# Patient Record
Sex: MALE | Race: WHITE
[De-identification: names, ages, dates, MRNs, and addresses within clinical notes are randomized per-mention and may not be internally consistent; named-entity substitution may affect disease eponyms.]

---

## 2021-09-25 ENCOUNTER — HOSPITAL ENCOUNTER (EMERGENCY)
Dept: HOSPITAL 97 - ER | Age: 61
Discharge: HOME | End: 2021-09-25
Payer: COMMERCIAL

## 2021-09-25 VITALS — SYSTOLIC BLOOD PRESSURE: 167 MMHG | OXYGEN SATURATION: 99 % | DIASTOLIC BLOOD PRESSURE: 89 MMHG | TEMPERATURE: 98.2 F

## 2021-09-25 DIAGNOSIS — R33.9: Primary | ICD-10-CM

## 2021-09-25 LAB
ALBUMIN SERPL BCP-MCNC: 4.1 G/DL (ref 3.4–5)
ALP SERPL-CCNC: 90 U/L (ref 45–117)
ALT SERPL W P-5'-P-CCNC: 32 U/L (ref 12–78)
AST SERPL W P-5'-P-CCNC: 43 U/L (ref 15–37)
BLD SMEAR INTERP: (no result)
BUN BLD-MCNC: 13 MG/DL (ref 7–18)
GLUCOSE SERPLBLD-MCNC: 120 MG/DL (ref 74–106)
HCT VFR BLD CALC: 45.7 % (ref 39.6–49)
LIPASE SERPL-CCNC: 73 U/L (ref 73–393)
LYMPHOCYTES # SPEC AUTO: 1.2 K/UL (ref 0.7–4.9)
MORPHOLOGY BLD-IMP: (no result)
PMV BLD: 8.8 FL (ref 7.6–11.3)
POTASSIUM SERPL-SCNC: 4.3 MMOL/L (ref 3.5–5.1)
RBC # BLD: 5.15 M/UL (ref 4.33–5.43)

## 2021-09-25 PROCEDURE — 80076 HEPATIC FUNCTION PANEL: CPT

## 2021-09-25 PROCEDURE — 80048 BASIC METABOLIC PNL TOTAL CA: CPT

## 2021-09-25 PROCEDURE — 74177 CT ABD & PELVIS W/CONTRAST: CPT

## 2021-09-25 PROCEDURE — 99285 EMERGENCY DEPT VISIT HI MDM: CPT

## 2021-09-25 PROCEDURE — 36415 COLL VENOUS BLD VENIPUNCTURE: CPT

## 2021-09-25 PROCEDURE — 83690 ASSAY OF LIPASE: CPT

## 2021-09-25 PROCEDURE — 51702 INSERT TEMP BLADDER CATH: CPT

## 2021-09-25 PROCEDURE — 81015 MICROSCOPIC EXAM OF URINE: CPT

## 2021-09-25 PROCEDURE — 81003 URINALYSIS AUTO W/O SCOPE: CPT

## 2021-09-25 PROCEDURE — 85025 COMPLETE CBC W/AUTO DIFF WBC: CPT

## 2021-09-25 NOTE — RAD REPORT
EXAM DESCRIPTION:  CTAbdomen   Pelvis W Contrast - 9/25/2021 4:06 pm

 

CLINICAL HISTORY:  Abdominal pain.

Difficulty urinating

 

COMPARISON:  No comparisons

 

TECHNIQUE:  Biphasic CT imaging of the abdomen and pelvis was performed with 100 ml non-ionic IV cont
rast.

 

All CT scans are performed using dose optimization technique as appropriate and may include automated
 exposure control or mA/KV adjustment according to patient size.

 

FINDINGS:  The lung bases are clear.

 

Mild fatty liver is seen with a small hepatic cysts present. The spleen, pancreas, adrenal glands and
 kidneys are within normal limits.

 

No bowel obstruction, free air, free fluid or abscess.  The appendix is normal.  No evidence of signi
ficant lymphadenopathy.

 

Assessment of intrapelvic structures is limited by significant streak artifact a Baeza catheter is no
judy in the urinary bladder.

 

Moderate lumbar degenerative changes.

 

IMPRESSION:  No acute intra-abdominal or pelvic finding.

 

There is significant streak artifact reducing image quality of the pelvis.

## 2021-09-25 NOTE — ER
Nurse's Notes                                                                                     

 [facility]                                                                 

Name: Joshua Kruger                                                                               

Age: 61 yrs                                                                                       

Sex: Male                                                                                         

: 1960                                                                                   

MRN: [id number]                                                                                   

Arrival Date: 2021                                                                          

Time: 14:31                                                                                       

Account#: [id number]                                                                            

Bed 18                                                                                            

Private MD:                                                                                       

Diagnosis: Retention of urine, unspecified                                                        

                                                                                                  

Presentation:                                                                                     

                                                                                             

14:36 Chief complaint: Patient states: Unable to urinate well since last night. No fever. No  ll1 

      significant pain,. Coronavirus screen: Vaccine status: Patient reports receiving the        

      1st dose of the Covid vaccine. Client denies travel out of the [place] in the last 14          

      days. At this time, the client does not indicate any symptoms associated with               

      coronavirus-19. Ebola Screen: Patient denies travel to an Ebola-affected area in the 21     

      days before illness onset. Initial Sepsis Screen: Does the patient meet any 2 criteria?     

      HR > 90 bpm. No. Patient's initial sepsis screen is negative. Does the patient have a       

      suspected source of infection? Yes: Dysuria/Frequency/Urgency/UTI. Risk Assessment: Do      

      you want to hurt yourself or someone else? Patient reports no desire to harm self or        

      others. Onset of symptoms was 2021.                                           

14:36 Method Of Arrival: Ambulatory                                                           ll1 

14:36 Acuity: JANICE 3                                                                           ll1 

                                                                                                  

Historical:                                                                                       

- Allergies:                                                                                      

14:36 No Known Allergies;                                                                     ll1 

- PMHx:                                                                                           

14:36 None;                                                                                   ll1 

- PSHx:                                                                                           

14:36 hip replacements;                                                                       ll1 

                                                                                                  

- Immunization history:: Client reports receiving the Jus \T\ Jus single-dose             

  vaccine. Flu vaccine status is unknown.                                                         

- Social history:: Smoking status: Patient denies any tobacco usage or history of.                

                                                                                                  

                                                                                                  

Screening:                                                                                        

15:46 Abuse screen: Denies threats or abuse. Denies injuries from another. Nutritional        tr6 

      screening: No deficits noted. Tuberculosis screening: No symptoms or risk factors           

      identified. Fall Risk None identified.                                                      

                                                                                                  

Assessment:                                                                                       

15:46 General: Appears in no apparent distress. uncomfortable, Behavior is calm, cooperative, tr6 

      appropriate for age. Pain: Complains of pain in lower abdomen. Neuro: No deficits           

      noted. Cardiovascular: No deficits noted. Respiratory: No deficits noted. GI: Bowel         

      sounds present X 4 quads. Abdomen is tender to palpation. : Reports inability to          

      void, since last night. EENT: No deficits noted. Derm: No deficits noted.                   

      Musculoskeletal: No deficits noted.                                                         

17:05 Reassessment: JESSEE Merritt discussed results and POC with pt. pt verbalized understanding tr6 

      and would like the littlejohn removed. Littlejohn removed by this RN and pt tolerated well. pt        

      understands that it is possible to have retention once again. pt verbalized                 

      understanding and understands that he will have to come back to the ED if problem           

      persists..                                                                                  

                                                                                                  

Vital Signs:                                                                                      

14:36  / 89; Pulse 97; Resp 17; Temp 98.2; Pulse Ox 99% ; Weight 80.74 kg; Height 5 ft. ll1 

      11 in. (180.34 cm); Pain 1/10;                                                              

14:36 Body Mass Index 24.83 (80.74 kg, 180.34 cm)                                             ll1 

                                                                                                  

ED Course:                                                                                        

14:31 Patient arrived in ED.                                                                  mr  

14:36 Arm band placed on.                                                                     ll1 

14:37 Triage completed.                                                                       ll1 

14:39 Breonna Crum, RN is Primary Nurse.                                                 tr6 

14:40 Jeremy Merritt NP is PHCP.                                                           pm1 

14:40 Heber Vieyra MD is Attending Physician.                                           pm1 

15:04 Bladder scan completed. 850. Littlejohn cath inserted, using sterile technique, 16 Fr., by   mt  

      me, balloon inflated, to gravity drainage, returned clear yellow urine. Patient             

      tolerated well.                                                                             

15:30 Inserted saline lock: 20 gauge in right antecubital area, using aseptic technique.      tr6 

      Blood collected.                                                                            

15:46 No apparent distress. Resting quietly. Awaiting lab results.                            tr6 

15:46 Patient has correct armband on for positive identification. Placed in gown. Bed in low  tr6 

      position. Call light in reach. Pulse ox on. NIBP on. Door closed. Noise minimized.          

      Visitors limited. Lights dimmed. Moved to private room. Warm blanket given. Diet:           

      Patient is NPO.                                                                             

15:46 No provider procedures requiring assistance completed. Patient maintains SpO2           tr6 

      saturation greater than 95% on room air.                                                    

15:57 Patient moved to CT.                                                                    tr6 

16:05 CT Abd/Pelvis - IV Contrast Only In Process Unspecified.                                EDMS

16:50 IV discontinued, intact, bleeding controlled, No redness/swelling at site. Pressure     tr6 

      dressing applied.                                                                           

17:04 Littlejohn cath removed intact, balloon deflated.                                            tr6 

                                                                                                  

Administered Medications:                                                                         

No medications were administered                                                                  

                                                                                                  

                                                                                                  

Output:                                                                                           

15:04 Urine: 1000ml (Littlejohn); Total: 1000ml.                                                   mt  

17:00 Urine: 400ml (Littlejohn); Total: 1400ml.                                                    tr6 

                                                                                                  

Outcome:                                                                                          

16:39 Discharge ordered by MD.                                                                pm1 

16:49 Discharged to home ambulatory.                                                          tr6 

16:49 Condition: good                                                                             

16:49 Discharge instructions given to patient, Instructed on discharge instructions, follow       

      up and referral plans. safety practices, Demonstrated understanding of instructions,        

      follow-up care, Prescriptions given X                                                       

17:05 Patient left the ED.                                                                    tr6 

                                                                                                  

Signatures:                                                                                       

Dispatcher [employer]                           EDMS                                                 

JaquanJazlyn                                 mr MerrittJeremy, NP                    NP   pm1                                                  

Eun Raza mt, Lynsay RN                       RN   ll1                                                  

Breonna Crum RN                   RN   tr6                                                  

                                                                                                  

**************************************************************************************************

## 2021-09-25 NOTE — EDPHYS
Physician Documentation                                                                           

 [facility]                                                                 

Name: Joshua Kruger                                                                               

Age: 61 yrs                                                                                       

Sex: Male                                                                                         

: 1960                                                                                   

MRN: [id number]                                                                                   

Arrival Date: 2021                                                                          

Time: 14:31                                                                                       

Account#: [id number]                                                                            

Bed 18                                                                                            

Private MD:                                                                                       

ED Physician Heber Vieyra                                                                    

HPI:                                                                                              

                                                                                             

15:20 This 61 yrs old  Male presents to ER via Ambulatory with complaints of Urinary pm1 

      Problem.                                                                                    

15:20 Onset: The symptoms/episode began/occurred yesterday.                                   pm1 

15:20 The patient presents with urinary symptoms, unable to void. Modifying factors: The      pm1 

      symptoms are alleviated by nothing, the symptoms are aggravated by nothing. Associated      

      signs and symptoms: Pertinent negatives: abdominal pain, constipation, fever, nausea,       

      vomiting. Severity of symptoms: in the emergency department the symptoms are unchanged.     

      The patient has not experienced similar symptoms in the past. The patient has not           

      recently seen a physician. Patient recently taking cough congestion medication.             

                                                                                                  

Historical:                                                                                       

- Allergies:                                                                                      

14:36 No Known Allergies;                                                                     ll1 

- PMHx:                                                                                           

14:36 None;                                                                                   ll1 

- PSHx:                                                                                           

14:36 hip replacements;                                                                       ll1 

                                                                                                  

- Immunization history:: Client reports receiving the Jus \T\ Jus single-dose             

  vaccine. Flu vaccine status is unknown.                                                         

- Social history:: Smoking status: Patient denies any tobacco usage or history of.                

                                                                                                  

                                                                                                  

ROS:                                                                                              

15:20 Constitutional: Negative for fever, chills, and weight loss, Cardiovascular: Negative   pm1 

      for chest pain, palpitations, and edema, Respiratory: Negative for shortness of breath,     

      cough, wheezing, and pleuritic chest pain, Abdomen/GI: Negative for abdominal pain,         

      nausea, vomiting, diarrhea, and constipation.                                               

15:20 MS/Extremity: Negative for injury and deformity, Skin: Negative for injury, rash, and       

      discoloration, Neuro: Negative for headache, weakness, numbness, tingling, and seizure.     

15:20 : Positive for difficulty urinating.                                                      

15:20 All other systems are negative.                                                             

                                                                                                  

Exam:                                                                                             

15:20 Constitutional:  This is a well developed, well nourished patient who is awake, alert,  pm1 

      and in no acute distress. Head/Face:  Normocephalic, atraumatic.                            

15:20 Back:  No spinal tenderness.  No costovertebral tenderness.  Full range of motion.          

      Skin:  Warm, dry with normal turgor.  Normal color with no rashes, no lesions, and no       

      evidence of cellulitis. MS/ Extremity:  Pulses equal, no cyanosis.  Neurovascular           

      intact.  Full, normal range of motion.                                                      

15:20 Eyes: Exam is negative for acute changes, Extraocular movements: intact throughout,         

      Conjunctiva: no acute changes, no injection.                                                

15:20 ENT: Exam is negative for acute changes, Mouth: no acute changes, Lips: normal, moist,      

      Oral mucosa: normal, pink and intact, moist.                                                

15:20 Cardiovascular: Exam negative for  acute changes, Rate: normal, Rhythm: regular,            

      Pulses: no pulse deficits are appreciated.                                                  

15:20 Respiratory: Exam negative for  acute changes, respiratory distress, shortness of           

      breath, Breath sounds: are clear throughout.                                                

15:20 Abdomen/GI: Inspection: abdomen appears normal, Palpation: abdomen is soft and              

      non-tender, in all quadrants.                                                               

15:20 Neuro: Exam negative for acute changes, Orientation: is normal, Mentation: is normal,       

      Motor: is normal, moves all fours.                                                          

                                                                                                  

Vital Signs:                                                                                      

14:36  / 89; Pulse 97; Resp 17; Temp 98.2; Pulse Ox 99% ; Weight 80.74 kg; Height 5 ft. ll1 

      11 in. (180.34 cm); Pain 1/10;                                                              

14:36 Body Mass Index 24.83 (80.74 kg, 180.34 cm)                                             ll1 

                                                                                                  

MDM:                                                                                              

14:46 Patient medically screened.                                                             pm1 

16:38 Data reviewed: vital signs. Data interpreted: Pulse oximetry: on room air is 99 %.      pm1 

      Interpretation: normal. Counseling: I had a detailed discussion with the patient and/or     

      guardian regarding: the historical points, exam findings, and any diagnostic results        

      supporting the discharge/admit diagnosis, lab results, radiology results, the need for      

      outpatient follow up, for definitive care, a urologist, to return to the emergency          

      department if symptoms worsen or persist or if there are any questions or concerns that     

      arise at home.                                                                              

16:42 Refusal of service: The patient/guardian displays adequate decision making capability   pm1 

      and despite a detailed discussion of alternatives, benefits, risks, and consequences        

      refuses: To keep Baeza catheter in. Patient requested to be discharged home without         

      catheter.                                                                                   

                                                                                                  

                                                                                             

14:45 Order name: Basic Metabolic Panel                                                       pm1 

                                                                                             

14:45 Order name: CBC with Diff                                                               pm1 

                                                                                             

14:45 Order name: Hepatic Function; Complete Time: 16:37                                      pm1 

                                                                                             

14:45 Order name: Lipase; Complete Time: 16:37                                                pm1 

                                                                                             

14:45 Order name: Urine Microscopic Only; Complete Time: 16:37                                pm                                                                                             

14:46 Order name: Basic Metabolic Panel; Complete Time: 16:37                                 EDMS

                                                                                             

14:45 Order name: IV Saline Lock; Complete Time: 15:07                                        pm                                                                                             

14:45 Order name: Labs collected and sent; Complete Time: 15:07                               pm                                                                                             

14:45 Order name: CT Abd/Pelvis - IV Contrast Only; Complete Time: 16:37                      pm                                                                                             

14:45 Order name: Bladder Scanner; Complete Time: 15:05                                       pm                                                                                             

14:45 Order name: Urine Dipstick-Ancillary (obtain specimen); Complete Time: 15:11            pm                                                                                             

14:46 Order name: CBC with Automated Diff; Complete Time: 16:37                               EDMS

                                                                                             

15:12 Order name: Urine Dipstick-Ancillary; Complete Time: 15:21                              EDMS

                                                                                             

15:20 Order name: CBC Smear Scan; Complete Time: 16:37                                        EDMS

                                                                                             

14:52 Order name: Baeza; Complete Time: 15:05                                                 pm                                                                                             

16:38 Order name: Leg Bag; Complete Time: 16:51                                               pm1 

                                                                                                  

Administered Medications:                                                                         

No medications were administered                                                                  

                                                                                                  

                                                                                                  

Disposition Summary:                                                                              

21 16:39                                                                                    

Discharge Ordered                                                                                 

      Location: Home                                                                          pm1 

      Problem: new                                                                            pm1 

      Symptoms: have improved                                                                 pm1 

      Condition: Stable                                                                       pm1 

      Diagnosis                                                                                   

        - Retention of urine, unspecified                                                     pm1 

      Followup:                                                                               pm1 

        - With: Emergency Department                                                               

        - When: As needed                                                                          

        - Reason: Worsening of condition                                                           

      Followup:                                                                               pm1 

        - With: Private Physician                                                                  

        - When: 2 - 3 days                                                                         

        - Reason: Recheck today's complaints, Continuance of care, Re-evaluation by your           

      physician                                                                                   

      Discharge Instructions:                                                                     

        - Discharge Summary Sheet                                                             pm1 

        - Acute Urinary Retention, Male                                                       pm1 

      Forms:                                                                                      

        - Medication Reconciliation Form                                                      pm1 

        - Thank You Letter                                                                    pm1 

        - Antibiotic Education                                                                pm1 

        - Prescription Opioid Use                                                             pm1 

Addendum:                                                                                         

10/01/2021                                                                                        

     04:39 Co-signature as Attending Physician, Heber Vieyra MD PA/NP's history reviewed,      m
a2

           patient interviewed, and examined. I agree with assessment and care plan and confirm   

           the diagnosis (es) above.                                                              

                                                                                                  

Signatures:                                                                                       

Dispatcher [employer]                           EDJeremy Mcguire, NP                    NP   pm1                                                  

Heber Vieyra MD MD   ma2                                                  

Pia Ashford RN                       RN   ll1                                                  

                                                                                                  

**************************************************************************************************

## 2021-09-26 ENCOUNTER — HOSPITAL ENCOUNTER (EMERGENCY)
Dept: HOSPITAL 97 - ER | Age: 61
Discharge: HOME | End: 2021-09-26
Payer: COMMERCIAL

## 2021-09-26 VITALS — DIASTOLIC BLOOD PRESSURE: 86 MMHG | SYSTOLIC BLOOD PRESSURE: 137 MMHG | TEMPERATURE: 97.7 F | OXYGEN SATURATION: 99 %

## 2021-09-26 DIAGNOSIS — R33.9: Primary | ICD-10-CM

## 2021-09-26 PROCEDURE — 81003 URINALYSIS AUTO W/O SCOPE: CPT

## 2021-09-26 PROCEDURE — 51702 INSERT TEMP BLADDER CATH: CPT

## 2021-09-26 PROCEDURE — 99284 EMERGENCY DEPT VISIT MOD MDM: CPT

## 2021-09-26 PROCEDURE — 81015 MICROSCOPIC EXAM OF URINE: CPT

## 2021-09-26 NOTE — ER
Nurse's Notes                                                                                     

 [facility]                                                                 

Name: Joshua Kruger                                                                               

Age: 61 yrs                                                                                       

Sex: Male                                                                                         

: 1960                                                                                   

MRN: [id number]                                                                                   

Arrival Date: 2021                                                                          

Time: 14:00                                                                                       

Account#: [id number]                                                                            

Bed 17                                                                                            

Private MD:                                                                                       

Diagnosis: Retention of urine, unspecified                                                        

                                                                                                  

Presentation:                                                                                     

                                                                                             

14:10 Chief complaint: Patient states: Pt stated, " I was just here yesterday and they stated kg  

      that I needed to keep the catheter in but I told them I didn't want to keep it in and I     

      think that was a mistake. It burns really bad when I urinate, barely a squirt will come     

      out but I also haven't been drinking anything.". Coronavirus screen: Vaccine status:        

      Patient reports receiving the 1st dose of the Covid vaccine. Date 2021            

      [facility] Client denies travel out of the [place] in the last 14 days. At this       

      time, the client does not indicate any symptoms associated with coronavirus-19. Ebola       

      Screen: Patient negative for fever greater than or equal to 101.5 degrees Fahrenheit,       

      and additional compatible Ebola Virus Disease symptoms Patient denies exposure to           

      infectious person. Patient denies travel to an Ebola-affected area in the 21 days           

      before illness onset. Initial Sepsis Screen: Does the patient meet any 2 criteria? No.      

      Patient's initial sepsis screen is negative. Does the patient have a suspected source       

      of infection? No. Patient's initial sepsis screen is negative. Risk Assessment: Do you      

      want to hurt yourself or someone else? Patient reports no desire to harm self or            

      others. Onset of symptoms was 2021.                                           

14:10 Method Of Arrival: Ambulatory                                                           kg  

14:10 Acuity: JANICE 3                                                                           kg  

                                                                                                  

Triage Assessment:                                                                                

14:16 General: Appears in no apparent distress. Behavior is calm, cooperative, appropriate    kg  

      for age, quiet. Pain: Denies pain. GI: No deficits noted. : Reports burning with          

      urination, discharge, Puss when urinating pain with urination.                              

                                                                                                  

Historical:                                                                                       

- Allergies:                                                                                      

14:16 No Known Allergies;                                                                     kg  

- Home Meds:                                                                                      

14:16 None [Active];                                                                          kg  

- PMHx:                                                                                           

14:16 None;                                                                                   kg  

- PSHx:                                                                                           

14:16 hip replacements;                                                                       kg  

                                                                                                  

- Immunization history:: Adult Immunizations not up to date, Client reports receiving             

  the Jus \T\ Jus single-dose vaccine. Date received 2021.                      

- Social history:: Smoking status: Patient denies any tobacco usage or history of.                

                                                                                                  

                                                                                                  

Screenin:11 Abuse screen: Denies threats or abuse. Denies injuries from another. Nutritional        tr6 

      screening: No deficits noted. Tuberculosis screening: No symptoms or risk factors           

      identified. Fall Risk None identified.                                                      

                                                                                                  

Assessment:                                                                                       

15:00 General: Appears in no apparent distress. comfortable, Behavior is calm, cooperative,   tr6 

      appropriate for age. Pain: Complains of pain in urethra. Neuro: No deficits noted.          

      Cardiovascular: No deficits noted. Respiratory: No deficits noted. GI: No deficits          

      noted. Bowel sounds present X 4 quads. Abd is soft and non tender X 4 quads. :            

      Reports burning with urination. EENT: No deficits noted. Derm: No deficits noted.           

      Musculoskeletal: No deficits noted.                                                         

                                                                                                  

Vital Signs:                                                                                      

14:10  / 86; Pulse 84; Resp 20; Temp 97.7(TE); Pulse Ox 99% on R/A; Weight 80.74 kg     kg  

      (R); Height 5 ft. 11 in. (180.34 cm) (R); Pain 0/10;                                        

14:10 Body Mass Index 24.83 (80.74 kg, 180.34 cm)                                             kg  

                                                                                                  

ED Course:                                                                                        

14:00 Patient arrived in ED.                                                                  mr  

14:16 Triage completed.                                                                       kg  

14:20 Breonna Crum, RN is Primary Nurse.                                                 tr6 

14:23 Heber Vieyra MD is Attending Physician.                                           ma2 

14:25 Jeremy Merritt NP is PHCP.                                                           pm1 

14:48 Baeza cath inserted, using sterile technique, 16 Fr., by me, balloon inflated, to       tr6 

      gravity drainage, urine specimen collected.                                                 

15:54 Juanjo Ayon MD is Referral Physician.                                              pm1 

16:11 No provider procedures requiring assistance completed. Patient did not have IV access   tr6 

      during this emergency room visit.                                                           

16:11 Patient has correct armband on for positive identification. Bed in low position. Call   tr6 

      light in reach. Side rails up X 1. Door closed.                                             

16:11 Patient placed in an exam room.                                                         tr6 

                                                                                                  

Administered Medications:                                                                         

No medications were administered                                                                  

                                                                                                  

                                                                                                  

Output:                                                                                           

16:49 Urine: 800ml (Baeza); Total: 800ml.                                                     tr6 

                                                                                                  

Outcome:                                                                                          

15:54 Discharge ordered by MD.                                                                pm1 

16:11 Discharged to home ambulatory.                                                          tr6 

16:11 Condition: good                                                                             

16:11 Discharge instructions given to patient, Instructed on discharge instructions, follow       

      up and referral plans. Demonstrated understanding of instructions, follow-up care,          

      Prescriptions given X 1.                                                                    

16:26 Patient left the ED.                                                                    tr6 

                                                                                                  

Signatures:                                                                                       

Jazlyn Partida                                 mr                                                   

René, Jeremy, NP                    NP   pm1                                                  

Heber Vieyra MD MD   ma2                                                  

Breonna Crum RN                   RN   tr6                                                  

Perla Nicholas RN                     RN   kg                                                   

                                                                                                  

**************************************************************************************************

## 2021-09-26 NOTE — EDPHYS
Physician Documentation                                                                           

 [facility]                                                                 

Name: Joshua Kruger                                                                               

Age: 61 yrs                                                                                       

Sex: Male                                                                                         

: 1960                                                                                   

MRN: [id number]                                                                                   

Arrival Date: 2021                                                                          

Time: 14:00                                                                                       

Account#: [id number]                                                                            

Bed 17                                                                                            

Private MD:                                                                                       

ED Physician Heber Vieyra                                                                    

HPI:                                                                                              

                                                                                             

14:29 This 61 yrs old  Male presents to ER via Ambulatory with complaints of Urinary pm1 

      Retention.                                                                                  

14:29 The patient presents with urinary symptoms, retention. Onset: The symptoms/episode      pm1 

      began/occurred 2 day(s) ago. Modifying factors: The symptoms are alleviated by Baeza        

      catheter placed yesterday, the symptoms are aggravated by Removal of Baeza catheter         

      from yesterday. Associated signs and symptoms: Pertinent positives: Burning with            

      urination and pain today, Pertinent negatives: abdominal pain, fever. The patient has       

      not experienced similar symptoms in the past. The patient has been recently seen at the     

      [facility] Emergency Department, yesterday, for similar              

      complaints labs were performed, CT scan was performed. Patient seen yesterday in the ER     

      for the same complaint of urinary retention. Labs and CT scan performed and patient was     

      going to be discharged home with Baeza in place for follow-up with urology. Patient         

      wanted to go home without the Baeza yesterday. Patient returning back today with            

      complaints of continued urinary retention.                                                  

                                                                                                  

Historical:                                                                                       

- Allergies:                                                                                      

14:16 No Known Allergies;                                                                     kg  

- Home Meds:                                                                                      

14:16 None [Active];                                                                          kg  

- PMHx:                                                                                           

14:16 None;                                                                                   kg  

- PSHx:                                                                                           

14:16 hip replacements;                                                                       kg  

                                                                                                  

- Immunization history:: Adult Immunizations not up to date, Client reports receiving             

  the Jus \T\ Jus single-dose vaccine. Date received 2021.                      

- Social history:: Smoking status: Patient denies any tobacco usage or history of.                

                                                                                                  

                                                                                                  

ROS:                                                                                              

14:29 Constitutional: Negative for fever, chills, and weight loss, Cardiovascular: Negative   pm1 

      for chest pain, palpitations, and edema, Respiratory: Negative for shortness of breath,     

      cough, wheezing, and pleuritic chest pain.                                                  

14:29 MS/Extremity: Negative for injury and deformity, Skin: Negative for injury, rash, and       

      discoloration.                                                                              

14:29 Abdomen/GI: Negative for abdominal pain, nausea, vomiting, and diarrhea.                    

14:29 : Positive for burning with urination, Negative for flank pain.                           

14:29 All other systems are negative.                                                             

                                                                                                  

Exam:                                                                                             

14:29 Constitutional:  This is a well developed, well nourished patient who is awake, alert,  pm1 

      and in no acute distress. Head/Face:  Normocephalic, atraumatic.                            

14:29 Back:  No spinal tenderness.  No costovertebral tenderness.  Full range of motion.          

      Skin:  Warm, dry with normal turgor.  Normal color with no rashes, no lesions, and no       

      evidence of cellulitis. MS/ Extremity:  Pulses equal, no cyanosis.  Neurovascular           

      intact.  Full, normal range of motion.                                                      

14:29 Eyes: Exam is negative for acute changes, Extraocular movements: no acute changes,          

      Conjunctiva: no acute changes, no injection.                                                

14:29 Cardiovascular: Rate: normal, Rhythm: regular, Pulses: no pulse deficits are                

      appreciated.                                                                                

14:29 Respiratory: Exam negative for  acute changes, respiratory distress, shortness of           

      breath.                                                                                     

14:29 Abdomen/GI: Inspection: abdomen appears normal, Palpation: abdomen is soft and              

      non-tender, in all quadrants.                                                               

14:29 Neuro: Exam negative for acute changes, Orientation: is normal, Mentation: is normal,       

      Motor: is normal, moves all fours.                                                          

                                                                                                  

Vital Signs:                                                                                      

14:10  / 86; Pulse 84; Resp 20; Temp 97.7(TE); Pulse Ox 99% on R/A; Weight 80.74 kg     kg  

      (R); Height 5 ft. 11 in. (180.34 cm) (R); Pain 0/10;                                        

14:10 Body Mass Index 24.83 (80.74 kg, 180.34 cm)                                             kg  

                                                                                                  

MDM:                                                                                              

14:23 Patient medically screened.                                                             ma2 

15:52 Data reviewed: vital signs. Data interpreted: Pulse oximetry: on room air is 99 %.      pm1 

      Interpretation: normal.                                                                     

15:52 ED course: Patient with urinary retention yesterday and today. Patient with Baeza       pm1 

      catheter placed yesterday but patient elected to have it removed prior to discharge.        

      Patient was aware of the potential that he may return today for Baeza catheter              

      placement. Patient reports burning and pain with urinating. Urine micro negative but        

      due to symptoms and recent instrumentation will discharge the patient with Bactrim.         

                                                                                                  

                                                                                             

14:28 Order name: Urine Microscopic Only; Complete Time: 15:47                                pm1 

                                                                                             

14:47 Order name: Urine Dipstick-Ancillary; Complete Time: 15:12                              EDMS

                                                                                             

14:27 Order name: Bladder Scanner; Complete Time: 14:40                                       pm1 

                                                                                             

14:27 Order name: Baeza Leg Bag; Complete Time: 14:40                                         pm1 

                                                                                             

15:51 Order name: Urine Culture                                                               pm1 

                                                                                             

14:27 Order name: Baeza; Complete Time: 14:40                                                 pm1 

                                                                                             

14:28 Order name: Urine Dipstick-Ancillary (obtain specimen); Complete Time: 14:47            pm1 

                                                                                                  

Administered Medications:                                                                         

No medications were administered                                                                  

                                                                                                  

                                                                                                  

Disposition Summary:                                                                              

21 15:54                                                                                    

Discharge Ordered                                                                                 

      Location: Home                                                                          pm1 

      Problem: new                                                                            pm1 

      Symptoms: have improved                                                                 pm1 

      Condition: Stable                                                                       pm1 

      Diagnosis                                                                                   

        - Retention of urine, unspecified                                                     pm1 

      Followup:                                                                               pm1 

        - With: Emergency Department                                                               

        - When: As needed                                                                          

        - Reason: Worsening of condition                                                           

      Followup:                                                                               pm1 

        - With: Juanjo Ayon MD                                                                

        - When: 2 - 3 days                                                                         

        - Reason: Recheck today's complaints, Continuance of care, Re-evaluation by your           

      physician                                                                                   

      Discharge Instructions:                                                                     

        - Discharge Summary Sheet                                                             pm1 

        - Indwelling Urinary Catheter Care, Adult                                             pm1 

        - Acute Urinary Retention, Male                                                       pm1 

      Forms:                                                                                      

        - Medication Reconciliation Form                                                      pm1 

        - Thank You Letter                                                                    pm1 

        - Antibiotic Education                                                                pm1 

        - Prescription Opioid Use                                                             pm1 

      Prescriptions:                                                                              

        - Bactrim -160 mg Oral Tablet                                                        

            - take 1 tablet by ORAL route every 12 hours for 10 days; 20 tablet; Refills: 0,  pm1 

      Product Selection Permitted                                                                 

Addendum:                                                                                         

10/01/2021                                                                                        

     04:37 Co-signature as Attending Physician, Heber Vieyra MD PA/NP's history reviewed,      m
a2

           patient interviewed, and examined. I agree with assessment and care plan and confirm   

           the diagnosis (es) above.                                                              

                                                                                                  

Signatures:                                                                                       

Dispatcher [employer]                           EDJeremy Mcguire, NP                    NP   pm1                                                  

Heber Vieyra MD MD   ma2                                                  

Perla Nicholas, RN                     RN   kg                                                   

                                                                                                  

**************************************************************************************************

## 2021-11-01 LAB
BUN BLD-MCNC: 11 MG/DL (ref 7–18)
GLUCOSE SERPLBLD-MCNC: 110 MG/DL (ref 74–106)
HCT VFR BLD CALC: 46.7 % (ref 39.6–49)
INR BLD: 1.11
LYMPHOCYTES # SPEC AUTO: 1 K/UL (ref 0.7–4.9)
PMV BLD: 8.2 FL (ref 7.6–11.3)
POTASSIUM SERPL-SCNC: 4.2 MMOL/L (ref 3.5–5.1)
RBC # BLD: 5.25 M/UL (ref 4.33–5.43)
UA DIPSTICK W REFLEX MICRO PNL UR: (no result)

## 2021-11-01 NOTE — RAD REPORT
EXAM DESCRIPTION:  RAD - Chest Pa And Lat (2 Views) - 11/1/2021 1:37 pm

 

CLINICAL HISTORY:  pre-op, patient pending prostate surgery

 

COMPARISON:  November 2014

 

TECHNIQUE:  Frontal and lateral views of the chest were obtained.

 

FINDINGS:  The lungs are clear.   Heart size is normal and central vasculature is within normal limit
s.  No pleural effusion or pneumothorax seen.  No acute bony finding noted.  No aortic abnormality.

 

IMPRESSION:  No acute cardiopulmonary process.

 

No worrisome change from 2014 imaging.

## 2021-11-02 ENCOUNTER — HOSPITAL ENCOUNTER (OUTPATIENT)
Dept: HOSPITAL 97 - OR | Age: 61
Discharge: HOME | End: 2021-11-02
Attending: UROLOGY
Payer: COMMERCIAL

## 2021-11-02 VITALS — SYSTOLIC BLOOD PRESSURE: 113 MMHG | DIASTOLIC BLOOD PRESSURE: 76 MMHG

## 2021-11-02 VITALS — TEMPERATURE: 97.6 F

## 2021-11-02 VITALS — OXYGEN SATURATION: 97 %

## 2021-11-02 DIAGNOSIS — Z20.822: ICD-10-CM

## 2021-11-02 DIAGNOSIS — N40.1: Primary | ICD-10-CM

## 2021-11-02 DIAGNOSIS — R33.9: ICD-10-CM

## 2021-11-02 PROCEDURE — 71046 X-RAY EXAM CHEST 2 VIEWS: CPT

## 2021-11-02 PROCEDURE — 88305 TISSUE EXAM BY PATHOLOGIST: CPT

## 2021-11-02 PROCEDURE — 87088 URINE BACTERIA CULTURE: CPT

## 2021-11-02 PROCEDURE — 93005 ELECTROCARDIOGRAM TRACING: CPT

## 2021-11-02 PROCEDURE — 52601 PROSTATECTOMY (TURP): CPT

## 2021-11-02 PROCEDURE — 85025 COMPLETE CBC W/AUTO DIFF WBC: CPT

## 2021-11-02 PROCEDURE — 81003 URINALYSIS AUTO W/O SCOPE: CPT

## 2021-11-02 PROCEDURE — 85610 PROTHROMBIN TIME: CPT

## 2021-11-02 PROCEDURE — 80048 BASIC METABOLIC PNL TOTAL CA: CPT

## 2021-11-02 PROCEDURE — 0VT08ZZ RESECTION OF PROSTATE, VIA NATURAL OR ARTIFICIAL OPENING ENDOSCOPIC: ICD-10-PCS

## 2021-11-02 PROCEDURE — 81015 MICROSCOPIC EXAM OF URINE: CPT

## 2021-11-02 PROCEDURE — 87086 URINE CULTURE/COLONY COUNT: CPT

## 2021-11-02 PROCEDURE — 36415 COLL VENOUS BLD VENIPUNCTURE: CPT

## 2021-11-02 NOTE — EKG
Test Date:    2021-11-01               Test Time:    12:16:54

Technician:   LEONORA                                   

                                                     

MEASUREMENT RESULTS:                                       

Intervals:                                           

Rate:         69                                     

SC:           126                                    

QRSD:         90                                     

QT:           402                                    

QTc:          430                                    

Axis:                                                

P:            76                                     

SC:           126                                    

QRS:          78                                     

T:            69                                     

                                                     

INTERPRETIVE STATEMENTS:                                       

                                                     

Normal sinus rhythm

Normal ECG

Compared to ECG 11/01/2021 12:16:26

ST (T wave) deviation no longer present



Electronically Signed On 11-02-21 11:46:20 CDT by Daniel Enriquez

## 2021-11-03 NOTE — OP
Date of Procedure:  11/02/2021



Surgeon:  KEV FARIBANKS



Preoperative Diagnoses:  

1.Benign prostatic hypertrophy with lower urinary tract obstruction.

2.Acute urinary retention.



Postoperative Diagnoses:  

1.Benign prostatic hypertrophy with lower urinary tract obstruction.

2.Acute urinary retention.



Principal Procedure:  Cystoscopy and bipolar transurethral resection of the prostate.



Indication For Procedure:  Mr. Kruger presented to the Urology Clinic having been seen in the emerge
ncy department, requiring a catheter to be placed because of inability to void.  He was started on Fl
omax, but after about a week on the medication, he failed a voiding trial.  A catheter had to be rein
serted by me because of difficulties placing it by the medical assistant in the office associated wit
h penelope nurse practitioner.  He subsequently underwent a cystoscopic evaluation revealing significant gurdeep
tomic obstruction from his prostate and options were discussed to include medical therapy versus surg
ical therapy.  Further, given the acute nature of his retention and his impatience with the idea of h
aving a catheter, which was expressed on multiple occasions, I offered him the opportunity to proceed
 directly to surgical therapy or await PSA testing, which may require then a biopsy of the prostate i
f the PSA is elevated, which is a strong potential given the presence of the catheter.  Despite his p
rior protestations about wanting to have surgery quickly, the patient deferred to my judgment; and so
 I recommended we proceed with surgical therapy since he was in acute urinary retention to restore hi
s ability to void and we would assess his potential for prostate cancer thereafter. 



Of note, in preoperative counseling since the patient had taken Flomax, but had never been able to av
oid, chronically requiring the catheter, he was not yet familiar with the retrograde ejaculation/anej
aculation that is common with Flomax.  So, I counseled him as to the strong potential for that to occ
ur following a bipolar TURP.  He agreed to proceed regardless.



Procedure In Detail:  The patient was consented in the preoperative holding area before being transfe
rred to the operative suite where general anesthesia was induced.  He was given ampicillin and gentam
icin IV antimicrobial prophylaxis and pneumo boots were provided for DVT prophylaxis.  He was placed 
in the lithotomy position, padded and secured to the table appropriately.  His genitalia were prepped
 using Hibiclens and draped in standard fashion.  The indwelling urethral Baeza catheter had been rem
eligio prior to the prepping and draping.  The case was then begun by using urethral sounds to dilate t
he meatus and fossa navicularis to 30-Uzbek since it would not allow passage by the 26-Uzbek resect
oscope despite the indwelling catheter being present.  Once dilated, I was then able to pass the rese
ctoscope with ease via the urethra and into the bladder, observing the significant median lobe, eleva
judy median bar abutting the trigone as had previously been assessed.  There was also significant late
ral lobar hypertrophy with some anterior overhang also with intravesical projection.  The bladder was
 entered, and the median lobe was abutting the trigone and approximated in ureteral orifices bilatera
lly.  The bladder was then decompressed of urine and I refilled it with fluid, stabilizing it.  I the
n began the resection of the median lobe with direct vision of the left ureteral orifice, creating a 
trough from the orifice to the verumontanum sparing both.  I did the same thing on the right side lalito
ving the median component of the median lobe still attached.  I then resected the median lobe down to
 the level of the bladder neck and continued to resect the median bar down to the level of the verumo
ntanum to ensure a nice smooth trough.  I then began resecting the left lateral lobe and resected bryan
t from the bladder neck to the verumontanum extending from posteriorly to anteriorly.  I did the same
 thing on the right side, again extending from the bladder neck to the verumontanum and extending fro
m posterior to anteriorly, resecting the entirety of the intrusion of the lateral lobes of the prosta
te into the urethral lumen.  In the end, after significant resection, a beautiful trough was created 
from the verumontanum all the way into the bladder, taking care to spare a slight degree of anterior 
tissue not kissing lobes, but to hopefully preserve his antegrade ejaculatory function.  All prostate
 chips were Ellik evacuated from the bladder, and the ureteral orifices were observed to be intact an
d uninjured.  Careful fulguration was then performed of the entire surface of the prostate ensuring t
o stop any and all venous ooze as well as any arterial bleeders observed.  This was done on multiple 
occasions in order to completely gain hemostasis.  The bladder was decompressed, and with it complete
ly decompressed, additional hemostasis was performed.  In the end, the prostatic fossa was completely
 hemostatic and all prostatic chips had been removed from the bladder.  I then left the bladder full 
and removed the resectoscope.  I then placed a 24-Uzbek 3-way Baeza catheter into his bladder with e
ase and placed 40 cc of sterile water in the balloon.  The catheter was placed to moderate traction a
nd the patient were awakened from general anesthesia after being taken out of the lithotomy position.
  He was then transferred to a stretcher and then to the recovery room in good condition.



Complications:  None.



Discharge Disposition:  He may follow up in the Urology Clinic on Friday or Monday for catheter remov
al and active voiding trial.  Subsequent followup should be established between 6 weeks to 3 months l
ater with a preclinic PSA obtained to assess the potential for prostate cancer.  Of note, preoperativ
e DOMINIC was without nodularity felt.





SOTERO/ANASTASIIA

DD:  11/02/2021 16:32:27Voice ID:  [id number]

DT:  11/03/2021 00:43:03Report ID:  [id number]